# Patient Record
Sex: MALE
[De-identification: names, ages, dates, MRNs, and addresses within clinical notes are randomized per-mention and may not be internally consistent; named-entity substitution may affect disease eponyms.]

---

## 2024-09-19 ENCOUNTER — NON-APPOINTMENT (OUTPATIENT)
Age: 54
End: 2024-09-19

## 2024-09-20 ENCOUNTER — APPOINTMENT (OUTPATIENT)
Dept: PULMONOLOGY | Facility: CLINIC | Age: 54
End: 2024-09-20
Payer: MEDICAID

## 2024-09-20 VITALS
WEIGHT: 164 LBS | SYSTOLIC BLOOD PRESSURE: 157 MMHG | RESPIRATION RATE: 12 BRPM | HEART RATE: 100 BPM | DIASTOLIC BLOOD PRESSURE: 78 MMHG | TEMPERATURE: 98.8 F | OXYGEN SATURATION: 98 % | HEIGHT: 66 IN | BODY MASS INDEX: 26.36 KG/M2

## 2024-09-20 DIAGNOSIS — R06.02 SHORTNESS OF BREATH: ICD-10-CM

## 2024-09-20 PROBLEM — Z00.00 ENCOUNTER FOR PREVENTIVE HEALTH EXAMINATION: Status: ACTIVE | Noted: 2024-09-20

## 2024-09-20 PROCEDURE — G2211 COMPLEX E/M VISIT ADD ON: CPT | Mod: NC

## 2024-09-20 PROCEDURE — 99203 OFFICE O/P NEW LOW 30 MIN: CPT

## 2024-09-20 NOTE — ASSESSMENT
[FreeTextEntry1] : 54 year old never smoker presenting with shortness of breath for the last year   Data review: CXR 07/19/2024- Normal appearing CXR   Shortness of breath  Patient with no obvious clinical signs of shortness of breath. CXR reviewed and appears normal. Given this will need to obtain CT scan of the chest to evaluate for pulmonary cause given that he does have some questionable exposure hx.  - CXR negative with shortness of breath will obtain CT scan of the chest  RTC after CT chest

## 2024-09-20 NOTE — HISTORY OF PRESENT ILLNESS
[Never] : never [TextBox_4] : 54-year-old presenting with shortness of breath that has persisted for about the last year. States that he is a never smoker with no family hx of lung disease. He saw a cardiologist at [place] who did imaging and was told that an abnormality was found on CT scan of the chest but does not have the images. He does not have to stop when he walks and is able to go multiple blocks just notices that he is shorter of breath. He denies cough and wheezing. He has no workplace exposures and no pets in the home. [ESS] : 0

## 2024-09-20 NOTE — HISTORY OF PRESENT ILLNESS
[Never] : never [TextBox_4] : 54-year-old presenting with shortness of breath that has persisted for about the last year. States that he is a never smoker with no family hx of lung disease. He saw a cardiologist at [place] who did imaging and was told that an abnormality was found on CT scan of the chest but does not have the images. He does not have to stop when he walks and is able to go multiple blocks just notices that he is shorter of breath. He denies cough and wheezing. He has no workplace exposures and no pets in the home. [ESS] : 0